# Patient Record
Sex: FEMALE | URBAN - METROPOLITAN AREA
[De-identification: names, ages, dates, MRNs, and addresses within clinical notes are randomized per-mention and may not be internally consistent; named-entity substitution may affect disease eponyms.]

---

## 2019-03-17 LAB — EXTERNAL GROUP B STREP ANTIGEN: NEGATIVE

## 2019-09-04 LAB
EXTERNAL ABO GROUPING: NORMAL
EXTERNAL HEPATITIS B SURFACE ANTIGEN: NEGATIVE
EXTERNAL HEPATITIS C, RNA QUANT PCR: NEGATIVE
EXTERNAL RH FACTOR: POSITIVE
EXTERNAL RUBELLA QUALITATIVE: NORMAL
EXTERNAL SYPHILIS RPR SCREEN: NORMAL
HIV1 P24 AG SERPL QL IA: NORMAL

## 2019-12-26 ENCOUNTER — TELEPHONE (OUTPATIENT)
Dept: CARDIOLOGY CLINIC | Age: 29
End: 2019-12-26

## 2020-04-15 ENCOUNTER — PREP FOR SURGERY (OUTPATIENT)
Dept: OTHER | Facility: HOSPITAL | Age: 30
End: 2020-04-15

## 2020-04-15 RX ORDER — MORPHINE SULFATE 4 MG/ML
4 INJECTION, SOLUTION INTRAMUSCULAR; INTRAVENOUS
Status: CANCELLED | OUTPATIENT
Start: 2020-04-15

## 2020-04-15 RX ORDER — LIDOCAINE HYDROCHLORIDE 10 MG/ML
5 INJECTION, SOLUTION EPIDURAL; INFILTRATION; INTRACAUDAL; PERINEURAL AS NEEDED
Status: CANCELLED | OUTPATIENT
Start: 2020-04-15

## 2020-04-15 RX ORDER — SODIUM CHLORIDE, SODIUM LACTATE, POTASSIUM CHLORIDE, CALCIUM CHLORIDE 600; 310; 30; 20 MG/100ML; MG/100ML; MG/100ML; MG/100ML
125 INJECTION, SOLUTION INTRAVENOUS CONTINUOUS
Status: CANCELLED | OUTPATIENT
Start: 2020-04-15

## 2020-04-15 RX ORDER — OXYTOCIN-SODIUM CHLORIDE 0.9% IV SOLN 30 UNIT/500ML 30-0.9/5 UT/ML-%
125 SOLUTION INTRAVENOUS CONTINUOUS PRN
Status: CANCELLED | OUTPATIENT
Start: 2020-04-15

## 2020-04-15 RX ORDER — MISOPROSTOL 200 UG/1
800 TABLET ORAL AS NEEDED
Status: CANCELLED | OUTPATIENT
Start: 2020-04-15

## 2020-04-15 RX ORDER — ONDANSETRON 4 MG/1
4 TABLET, FILM COATED ORAL EVERY 6 HOURS PRN
Status: CANCELLED | OUTPATIENT
Start: 2020-04-15

## 2020-04-15 RX ORDER — OXYTOCIN-SODIUM CHLORIDE 0.9% IV SOLN 30 UNIT/500ML 30-0.9/5 UT/ML-%
250 SOLUTION INTRAVENOUS CONTINUOUS
Status: CANCELLED | OUTPATIENT
Start: 2020-04-15 | End: 2020-04-15

## 2020-04-15 RX ORDER — ACETAMINOPHEN 325 MG/1
650 TABLET ORAL EVERY 4 HOURS PRN
Status: CANCELLED | OUTPATIENT
Start: 2020-04-15

## 2020-04-15 RX ORDER — SODIUM CHLORIDE 0.9 % (FLUSH) 0.9 %
3-10 SYRINGE (ML) INJECTION AS NEEDED
Status: CANCELLED | OUTPATIENT
Start: 2020-04-15

## 2020-04-15 RX ORDER — OXYTOCIN-SODIUM CHLORIDE 0.9% IV SOLN 30 UNIT/500ML 30-0.9/5 UT/ML-%
2 SOLUTION INTRAVENOUS
Status: CANCELLED | OUTPATIENT
Start: 2020-04-17

## 2020-04-15 RX ORDER — METHYLERGONOVINE MALEATE 0.2 MG/ML
200 INJECTION INTRAVENOUS ONCE AS NEEDED
Status: CANCELLED | OUTPATIENT
Start: 2020-04-15

## 2020-04-15 RX ORDER — CARBOPROST TROMETHAMINE 250 UG/ML
250 INJECTION, SOLUTION INTRAMUSCULAR AS NEEDED
Status: CANCELLED | OUTPATIENT
Start: 2020-04-15

## 2020-04-15 RX ORDER — ONDANSETRON 2 MG/ML
4 INJECTION INTRAMUSCULAR; INTRAVENOUS EVERY 6 HOURS PRN
Status: CANCELLED | OUTPATIENT
Start: 2020-04-15

## 2020-04-15 RX ORDER — IBUPROFEN 600 MG/1
600 TABLET ORAL EVERY 6 HOURS PRN
Status: CANCELLED | OUTPATIENT
Start: 2020-04-15

## 2020-04-15 RX ORDER — SODIUM CHLORIDE 0.9 % (FLUSH) 0.9 %
3 SYRINGE (ML) INJECTION EVERY 12 HOURS SCHEDULED
Status: CANCELLED | OUTPATIENT
Start: 2020-04-15

## 2020-04-15 RX ORDER — OXYTOCIN-SODIUM CHLORIDE 0.9% IV SOLN 30 UNIT/500ML 30-0.9/5 UT/ML-%
999 SOLUTION INTRAVENOUS ONCE
Status: CANCELLED | OUTPATIENT
Start: 2020-04-15 | End: 2020-04-15

## 2020-04-15 NOTE — H&P
Obstetric History and Physical     Chief Complaint: Induction of labor    Subjective     Patient is a 29 y.o. female  with EDC of 20 , who presents for induction of labor.    Her prenatal care is benign.  Her previous obstetric/gynecological history is noted for is non-contributory.      Prenatal Information: Please see office H&P for full history  Prenatal Results    The patient does not have an associated pregnancy episode and working LISA. Some results will not display without a pregnancy episode and working LISA.   POC Urine Glucose/Protein     Test Value Reference Range Date Time    Urine Glucose        Urine Protein              Initial Prenatal Labs     Test Value Reference Range Date Time    Hemoglobin        Hematocrit        Platelets        Rubella IgG        Hepatitis B SAg        Hepatitis C Ab        RPR        ABO        Rh        Antibody Screen        HIV        Urine Culture        Gonorrhea        Chlamydia        TSH              2nd and 3rd Trimester     Test Value Reference Range Date Time    Hemoglobin (repeated)        Hematocrit (repeated)        GCT        Antibody Screen (repeated)        GTT Fasting        GTT 1 Hr        GTT 2 Hr        GTT 3 Hr        Group B Strep              Drug Screening     Test Value Reference Range Date Time    Amphetamine Screen        Barbiturate Screen        Benzodiazepine Screen        Methadone Screen        Phencyclidine Screen        Opiates Screen        THC Screen        Cocaine Screen        Propoxyphene Screen        Buprenorphine Screen        Methamphetamine Screen        Oxycodone Screen        Tricyclic Antidepressants Screen              Other (Risk screening)     Test Value Reference Range Date Time    Varicella IgG        Parvovirus IgG        CMV IgG        Cystic Fibrosis        Hemoglobin electrophoresis        NIPT        MSAFP-4        AFP (for NTD only)                       Past OB History:       OB History   No data  available               Past Medical History: No past medical history on file.     Past Surgical History No past surgical history on file.     Family History: No family history on file.   Social History:  has no tobacco history on file.   has no alcohol history on file.   has no drug history on file.        General ROS: Pertinent items are noted in HPI    Objective      Vitals:   There were no vitals filed for this visit.    Fetal Heart Rate Assessment:   +FHTs per doppler in office       Physical Exam:     General Appearance:    Alert, cooperative, in no acute distress   Abdomen:     Soft, non-tender, no guarding, no rebound tenderness,       EFW 8#6oz per US on 4/15.   Pelvic Exam:    Pelvis adequate, proven to 9#    Presentation: Vertex    Cervix: 3/75/-2 in office   Extremities:   Moves all extremities well, no edema, no cyanosis, no              redness   Skin:   No bleeding, bruising or rash   Neurologic:   No focal neurologic defect          Laboratory Results:   Lab Results (last 48 hours)     ** No results found for the last 48 hours. **             Assessment/Plan     Active Problems:    * No active hospital problems. *         Assessment:  1.  Intrauterine pregnancy at 40+5 wks gestation on scheduled day of arrival with reassuring fetal status.    2.  IOL   3.  GBS status: Neg    Plan:  1. Pitocin IOL.   2. Plan of care has been reviewed with patient.  3.  Risks, benefits of treatment plan have been discussed.  4.  All questions have been answered.         Isabella Covington MD   4/15/2020   16:52

## 2020-04-17 ENCOUNTER — HOSPITAL ENCOUNTER (INPATIENT)
Facility: HOSPITAL | Age: 30
LOS: 2 days | Discharge: HOME | End: 2020-04-19
Attending: OBSTETRICS & GYNECOLOGY | Admitting: OBSTETRICS & GYNECOLOGY

## 2020-04-17 ENCOUNTER — ANESTHESIA EVENT (OUTPATIENT)
Dept: LABOR AND DELIVERY | Facility: HOSPITAL | Age: 30
End: 2020-04-17

## 2020-04-17 ENCOUNTER — ANESTHESIA (OUTPATIENT)
Dept: LABOR AND DELIVERY | Facility: HOSPITAL | Age: 30
End: 2020-04-17

## 2020-04-17 ENCOUNTER — HOSPITAL ENCOUNTER (OUTPATIENT)
Dept: LABOR AND DELIVERY | Facility: HOSPITAL | Age: 30
Discharge: HOME | End: 2020-04-17

## 2020-04-17 PROBLEM — Z34.90 PREGNANCY: Status: ACTIVE | Noted: 2020-04-17

## 2020-04-17 PROBLEM — Z34.90 PREGNANCY: Status: RESOLVED | Noted: 2020-04-17 | Resolved: 2020-04-17

## 2020-04-17 LAB
ABO GROUP BLD: NORMAL
BASOPHILS # BLD AUTO: 0 10*3/MM3 (ref 0–0.2)
BASOPHILS NFR BLD AUTO: 0.2 % (ref 0–1.5)
BLD GP AB SCN SERPL QL: NEGATIVE
DEPRECATED RDW RBC AUTO: 44.2 FL (ref 37–54)
EOSINOPHIL # BLD AUTO: 0 10*3/MM3 (ref 0–0.4)
EOSINOPHIL NFR BLD AUTO: 0.5 % (ref 0.3–6.2)
ERYTHROCYTE [DISTWIDTH] IN BLOOD BY AUTOMATED COUNT: 13 % (ref 12.3–15.4)
HCT VFR BLD AUTO: 38.5 % (ref 34–46.6)
HGB BLD-MCNC: 13.6 G/DL (ref 12–15.9)
HIV1+2 AB SER QL: NORMAL
LYMPHOCYTES # BLD AUTO: 1.7 10*3/MM3 (ref 0.7–3.1)
LYMPHOCYTES NFR BLD AUTO: 17.3 % (ref 19.6–45.3)
MCH RBC QN AUTO: 34.5 PG (ref 26.6–33)
MCHC RBC AUTO-ENTMCNC: 35.3 G/DL (ref 31.5–35.7)
MCV RBC AUTO: 97.6 FL (ref 79–97)
MONOCYTES # BLD AUTO: 0.6 10*3/MM3 (ref 0.1–0.9)
MONOCYTES NFR BLD AUTO: 6.6 % (ref 5–12)
NEUTROPHILS # BLD AUTO: 7.2 10*3/MM3 (ref 1.7–7)
NEUTROPHILS NFR BLD AUTO: 75.4 % (ref 42.7–76)
NRBC BLD AUTO-RTO: 0 /100 WBC (ref 0–0.2)
PLATELET # BLD AUTO: 146 10*3/MM3 (ref 140–450)
PMV BLD AUTO: 10.3 FL (ref 6–12)
RBC # BLD AUTO: 3.94 10*6/MM3 (ref 3.77–5.28)
RH BLD: POSITIVE
RPR SER QL: NORMAL
T&S EXPIRATION DATE: NORMAL
WBC NRBC COR # BLD: 9.5 10*3/MM3 (ref 3.4–10.8)

## 2020-04-17 PROCEDURE — 63710000001 ONDANSETRON PER 8 MG: Performed by: OBSTETRICS & GYNECOLOGY

## 2020-04-17 PROCEDURE — G0432 EIA HIV-1/HIV-2 SCREEN: HCPCS | Performed by: OBSTETRICS & GYNECOLOGY

## 2020-04-17 PROCEDURE — 86901 BLOOD TYPING SEROLOGIC RH(D): CPT | Performed by: OBSTETRICS & GYNECOLOGY

## 2020-04-17 PROCEDURE — 25010000002 ROPIVACAINE PER 1 MG: Performed by: ANESTHESIOLOGY

## 2020-04-17 PROCEDURE — 0UQMXZZ REPAIR VULVA, EXTERNAL APPROACH: ICD-10-PCS | Performed by: OBSTETRICS & GYNECOLOGY

## 2020-04-17 PROCEDURE — 86850 RBC ANTIBODY SCREEN: CPT | Performed by: OBSTETRICS & GYNECOLOGY

## 2020-04-17 PROCEDURE — 10907ZC DRAINAGE OF AMNIOTIC FLUID, THERAPEUTIC FROM PRODUCTS OF CONCEPTION, VIA NATURAL OR ARTIFICIAL OPENING: ICD-10-PCS | Performed by: OBSTETRICS & GYNECOLOGY

## 2020-04-17 PROCEDURE — 25010000002 FENTANYL CITRATE (PF) 100 MCG/2ML SOLUTION: Performed by: ANESTHESIOLOGY

## 2020-04-17 PROCEDURE — 85025 COMPLETE CBC W/AUTO DIFF WBC: CPT | Performed by: OBSTETRICS & GYNECOLOGY

## 2020-04-17 PROCEDURE — 86901 BLOOD TYPING SEROLOGIC RH(D): CPT

## 2020-04-17 PROCEDURE — 86900 BLOOD TYPING SEROLOGIC ABO: CPT | Performed by: OBSTETRICS & GYNECOLOGY

## 2020-04-17 PROCEDURE — 86592 SYPHILIS TEST NON-TREP QUAL: CPT | Performed by: OBSTETRICS & GYNECOLOGY

## 2020-04-17 PROCEDURE — 86900 BLOOD TYPING SEROLOGIC ABO: CPT

## 2020-04-17 RX ORDER — OXYTOCIN-SODIUM CHLORIDE 0.9% IV SOLN 30 UNIT/500ML 30-0.9/5 UT/ML-%
250 SOLUTION INTRAVENOUS CONTINUOUS
Status: ACTIVE | OUTPATIENT
Start: 2020-04-17 | End: 2020-04-17

## 2020-04-17 RX ORDER — ACETAMINOPHEN 325 MG/1
650 TABLET ORAL EVERY 4 HOURS PRN
Status: DISCONTINUED | OUTPATIENT
Start: 2020-04-17 | End: 2020-04-17

## 2020-04-17 RX ORDER — SODIUM CHLORIDE 0.9 % (FLUSH) 0.9 %
3-10 SYRINGE (ML) INJECTION AS NEEDED
Status: DISCONTINUED | OUTPATIENT
Start: 2020-04-17 | End: 2020-04-17

## 2020-04-17 RX ORDER — PRENATAL VIT/IRON FUM/FOLIC AC 27MG-0.8MG
1 TABLET ORAL DAILY
Status: DISCONTINUED | OUTPATIENT
Start: 2020-04-18 | End: 2020-04-19 | Stop reason: HOSPADM

## 2020-04-17 RX ORDER — OXYTOCIN-SODIUM CHLORIDE 0.9% IV SOLN 30 UNIT/500ML 30-0.9/5 UT/ML-%
2 SOLUTION INTRAVENOUS
Status: DISCONTINUED | OUTPATIENT
Start: 2020-04-17 | End: 2020-04-17

## 2020-04-17 RX ORDER — ROPIVACAINE HYDROCHLORIDE 2 MG/ML
INJECTION, SOLUTION EPIDURAL; INFILTRATION; PERINEURAL CONTINUOUS PRN
Status: DISCONTINUED | OUTPATIENT
Start: 2020-04-17 | End: 2020-04-17 | Stop reason: SURG

## 2020-04-17 RX ORDER — LIDOCAINE HYDROCHLORIDE 10 MG/ML
5 INJECTION, SOLUTION EPIDURAL; INFILTRATION; INTRACAUDAL; PERINEURAL AS NEEDED
Status: DISCONTINUED | OUTPATIENT
Start: 2020-04-17 | End: 2020-04-17

## 2020-04-17 RX ORDER — DOCUSATE SODIUM 100 MG/1
100 CAPSULE, LIQUID FILLED ORAL 2 TIMES DAILY
Status: DISCONTINUED | OUTPATIENT
Start: 2020-04-17 | End: 2020-04-19 | Stop reason: HOSPADM

## 2020-04-17 RX ORDER — ONDANSETRON 2 MG/ML
4 INJECTION INTRAMUSCULAR; INTRAVENOUS EVERY 6 HOURS PRN
Status: DISCONTINUED | OUTPATIENT
Start: 2020-04-17 | End: 2020-04-17

## 2020-04-17 RX ORDER — LANOLIN 100 %
OINTMENT (GRAM) TOPICAL
Status: DISCONTINUED | OUTPATIENT
Start: 2020-04-17 | End: 2020-04-19 | Stop reason: HOSPADM

## 2020-04-17 RX ORDER — CARBOPROST TROMETHAMINE 250 UG/ML
250 INJECTION, SOLUTION INTRAMUSCULAR AS NEEDED
Status: DISCONTINUED | OUTPATIENT
Start: 2020-04-17 | End: 2020-04-17 | Stop reason: HOSPADM

## 2020-04-17 RX ORDER — MAGNESIUM CARB/ALUMINUM HYDROX 105-160MG
30 TABLET,CHEWABLE ORAL DAILY PRN
Status: DISCONTINUED | OUTPATIENT
Start: 2020-04-17 | End: 2020-04-17

## 2020-04-17 RX ORDER — MISOPROSTOL 200 UG/1
800 TABLET ORAL AS NEEDED
Status: DISCONTINUED | OUTPATIENT
Start: 2020-04-17 | End: 2020-04-17 | Stop reason: HOSPADM

## 2020-04-17 RX ORDER — SODIUM CHLORIDE, SODIUM LACTATE, POTASSIUM CHLORIDE, CALCIUM CHLORIDE 600; 310; 30; 20 MG/100ML; MG/100ML; MG/100ML; MG/100ML
125 INJECTION, SOLUTION INTRAVENOUS CONTINUOUS
Status: DISCONTINUED | OUTPATIENT
Start: 2020-04-17 | End: 2020-04-17

## 2020-04-17 RX ORDER — OXYTOCIN-SODIUM CHLORIDE 0.9% IV SOLN 30 UNIT/500ML 30-0.9/5 UT/ML-%
999 SOLUTION INTRAVENOUS ONCE
Status: DISCONTINUED | OUTPATIENT
Start: 2020-04-17 | End: 2020-04-19 | Stop reason: HOSPADM

## 2020-04-17 RX ORDER — OXYTOCIN-SODIUM CHLORIDE 0.9% IV SOLN 30 UNIT/500ML 30-0.9/5 UT/ML-%
125 SOLUTION INTRAVENOUS CONTINUOUS PRN
Status: DISCONTINUED | OUTPATIENT
Start: 2020-04-17 | End: 2020-04-19 | Stop reason: HOSPADM

## 2020-04-17 RX ORDER — IBUPROFEN 600 MG/1
600 TABLET ORAL EVERY 6 HOURS PRN
Status: DISCONTINUED | OUTPATIENT
Start: 2020-04-17 | End: 2020-04-17 | Stop reason: SDUPTHER

## 2020-04-17 RX ORDER — LIDOCAINE HYDROCHLORIDE AND EPINEPHRINE 10; 10 MG/ML; UG/ML
INJECTION, SOLUTION INFILTRATION; PERINEURAL AS NEEDED
Status: DISCONTINUED | OUTPATIENT
Start: 2020-04-17 | End: 2020-04-17 | Stop reason: SURG

## 2020-04-17 RX ORDER — SODIUM CHLORIDE 0.9 % (FLUSH) 0.9 %
3 SYRINGE (ML) INJECTION EVERY 12 HOURS SCHEDULED
Status: DISCONTINUED | OUTPATIENT
Start: 2020-04-17 | End: 2020-04-17

## 2020-04-17 RX ORDER — IBUPROFEN 600 MG/1
600 TABLET ORAL EVERY 6 HOURS PRN
Status: DISCONTINUED | OUTPATIENT
Start: 2020-04-17 | End: 2020-04-19 | Stop reason: HOSPADM

## 2020-04-17 RX ORDER — ROPIVACAINE HYDROCHLORIDE 2 MG/ML
INJECTION, SOLUTION EPIDURAL; INFILTRATION; PERINEURAL
Status: COMPLETED
Start: 2020-04-17 | End: 2020-04-17

## 2020-04-17 RX ORDER — METHYLERGONOVINE MALEATE 0.2 MG/ML
200 INJECTION INTRAVENOUS ONCE AS NEEDED
Status: DISCONTINUED | OUTPATIENT
Start: 2020-04-17 | End: 2020-04-17 | Stop reason: HOSPADM

## 2020-04-17 RX ORDER — PRENATAL VIT/IRON FUM/FOLIC AC 27MG-0.8MG
1 TABLET ORAL DAILY
COMMUNITY
End: 2023-03-07

## 2020-04-17 RX ORDER — ONDANSETRON 4 MG/1
4 TABLET, FILM COATED ORAL EVERY 6 HOURS PRN
Status: DISCONTINUED | OUTPATIENT
Start: 2020-04-17 | End: 2020-04-17

## 2020-04-17 RX ORDER — FENTANYL CITRATE 50 UG/ML
INJECTION, SOLUTION INTRAMUSCULAR; INTRAVENOUS
Status: COMPLETED | OUTPATIENT
Start: 2020-04-17 | End: 2020-04-17

## 2020-04-17 RX ORDER — FENTANYL CITRATE 50 UG/ML
INJECTION, SOLUTION INTRAMUSCULAR; INTRAVENOUS
Status: DISPENSED
Start: 2020-04-17 | End: 2020-04-18

## 2020-04-17 RX ORDER — SODIUM CHLORIDE 0.9 % (FLUSH) 0.9 %
1-10 SYRINGE (ML) INJECTION AS NEEDED
Status: DISCONTINUED | OUTPATIENT
Start: 2020-04-17 | End: 2020-04-19 | Stop reason: HOSPADM

## 2020-04-17 RX ORDER — ONDANSETRON 4 MG/1
4 TABLET, FILM COATED ORAL EVERY 8 HOURS PRN
Status: DISCONTINUED | OUTPATIENT
Start: 2020-04-17 | End: 2020-04-19 | Stop reason: HOSPADM

## 2020-04-17 RX ORDER — ACETAMINOPHEN 325 MG/1
650 TABLET ORAL EVERY 4 HOURS PRN
Status: DISCONTINUED | OUTPATIENT
Start: 2020-04-17 | End: 2020-04-17 | Stop reason: HOSPADM

## 2020-04-17 RX ORDER — MORPHINE SULFATE 4 MG/ML
4 INJECTION, SOLUTION INTRAMUSCULAR; INTRAVENOUS
Status: DISCONTINUED | OUTPATIENT
Start: 2020-04-17 | End: 2020-04-17

## 2020-04-17 RX ADMIN — ROPIVACAINE HYDROCHLORIDE 10 ML/HR: 2 INJECTION, SOLUTION EPIDURAL; INFILTRATION at 12:27

## 2020-04-17 RX ADMIN — DOCUSATE SODIUM 100 MG: 100 CAPSULE, LIQUID FILLED ORAL at 21:08

## 2020-04-17 RX ADMIN — BENZOCAINE: 11.4 AEROSOL, SPRAY TOPICAL at 15:40

## 2020-04-17 RX ADMIN — OXYTOCIN 2 MILLI-UNITS/MIN: 10 INJECTION INTRAVENOUS at 06:52

## 2020-04-17 RX ADMIN — SODIUM CHLORIDE, SODIUM LACTATE, POTASSIUM CHLORIDE, AND CALCIUM CHLORIDE 125 ML/HR: 600; 310; 30; 20 INJECTION, SOLUTION INTRAVENOUS at 05:56

## 2020-04-17 RX ADMIN — OXYTOCIN 250 ML/HR: 10 INJECTION INTRAVENOUS at 15:41

## 2020-04-17 RX ADMIN — FENTANYL CITRATE 100 MCG: 50 INJECTION, SOLUTION INTRAMUSCULAR; INTRAVENOUS at 12:29

## 2020-04-17 RX ADMIN — WITCH HAZEL 1 PAD: 500 SOLUTION RECTAL; TOPICAL at 15:40

## 2020-04-17 RX ADMIN — LIDOCAINE HYDROCHLORIDE,EPINEPHRINE BITARTRATE 3 ML: 10; .01 INJECTION, SOLUTION INFILTRATION; PERINEURAL at 12:25

## 2020-04-17 RX ADMIN — ONDANSETRON HYDROCHLORIDE 4 MG: 4 TABLET, FILM COATED ORAL at 16:16

## 2020-04-17 NOTE — ANESTHESIA PREPROCEDURE EVALUATION
Anesthesia Evaluation     Patient summary reviewed and Nursing notes reviewed   NPO Solid Status: > 6 hours  NPO Liquid Status: > 6 hours           Airway   Mallampati: II  TM distance: >3 FB  Neck ROM: full  No difficulty expected  Dental - normal exam     Pulmonary - negative pulmonary ROS and normal exam    breath sounds clear to auscultation  Cardiovascular - negative cardio ROS and normal exam    ECG reviewed  Rhythm: regular  Rate: normal        Neuro/Psych- negative ROS  GI/Hepatic/Renal/Endo - negative ROS     Musculoskeletal (-) negative ROS    Abdominal  - normal exam    Abdomen: soft.  Bowel sounds: normal.   Substance History - negative use     OB/GYN    (+) Pregnant,         Other - negative ROS                       Anesthesia Plan    ASA 2     CSE       Anesthetic plan, all risks, benefits, and alternatives have been provided, discussed and informed consent has been obtained with: patient.

## 2020-04-17 NOTE — ANESTHESIA POSTPROCEDURE EVALUATION
Patient: Victoriano Garcia    Procedure Summary     Date:  04/17/20 Room / Location:      Anesthesia Start:  1214 Anesthesia Stop:  1352    Procedure:  LABOR ANALGESIA Diagnosis:      Scheduled Providers:   Provider:  Tej Dunham MD    Anesthesia Type:  CSE ASA Status:  2          Anesthesia Type: CSE    Vitals  Vitals Value Taken Time   /71 4/17/2020  2:00 PM   Temp     Pulse 61 4/17/2020  2:00 PM   Resp     SpO2 100 % 4/17/2020  1:49 PM   Vitals shown include unvalidated device data.        Post Anesthesia Care and Evaluation    Patient location during evaluation: bedside  Patient participation: complete - patient participated  Level of consciousness: awake  Pain scale: See nurse's notes for pain score.  Pain management: adequate  Airway patency: patent  Anesthetic complications: No anesthetic complications  PONV Status: none  Cardiovascular status: acceptable  Respiratory status: acceptable  Hydration status: acceptable    Comments: Patient seen and examined postoperatively; vital signs stable; SpO2 greater than or equal to 90%; cardiopulmonary status stable; nausea/vomiting adequately controlled; pain adequately controlled; no apparent anesthesia complications; patient discharged from anesthesia care when discharge criteria were met

## 2020-04-17 NOTE — INTERVAL H&P NOTE
H&P updated. The patient was examined and no complaints   [place] q 3 FHTs category 1  SVE 4/50/-2   AROM-clear  Term IOL   GBS neg  FSR   Cont pitocin.

## 2020-04-17 NOTE — ANESTHESIA PROCEDURE NOTES
CSE Block      Patient reassessed immediately prior to procedure    Patient location during procedure: OB  Start time: 4/17/2020 12:14 PM  End time: 4/17/2020 12:29 PM  Reason for block: at surgeon's request  Staffing  Anesthesiologist: Jose Covington MD  Preanesthetic Checklist  Completed: patient identified, site marked, surgical consent, pre-op evaluation, timeout performed, IV checked, risks and benefits discussed and monitors and equipment checked  CSE  Patient position: sitting  Patient monitoring: blood pressure monitoring and continuous pulse oximetry  Procedures: landmark technique and palpation technique  Spinal Needle  Needle type: Gordon (Through Tuohy)   Needle gauge: 27 G  Approach: midline  Location: L3-4  Fluid Appearance: clear  Spinal medications used: fentaNYL (SUBLIMAZE) injection, 100 mcg  Epidural Needle  Injection technique: RENETTA air  Needle type: Tuohy   Needle gauge: 18 G  Location: L3-L4  Level: 3-4  Aspiration: negative  Test dose: negative      Catheter  Catheter type: end hole  Catheter size: 20 G  Assessment  Dressing:occlusive dressing applied and secured with tape  Pt Tolerance:patient tolerated the procedure well with no apparent complications  Complications:no

## 2020-04-17 NOTE — PROGRESS NOTES
" [facility]  Obstetric Progress Note    Subjective   Getting epidural.     Objective     Vitals:  Vitals:    04/17/20 0536 04/17/20 0600 04/17/20 0700   BP:  111/70 103/66   BP Location:  Right arm    Patient Position:  Sitting    Pulse:  87 81   Resp:  18 18   Temp:  98.5 °F (36.9 °C)    TempSrc:  Oral    SpO2:  100%    Weight: 89 kg (196 lb 3.4 oz) 89 kg (196 lb 3.4 oz)    Height: 165.1 cm (65\") 165.1 cm (65\")      Flowsheet Rows      First Filed Value   Admission Height  165.1 cm (65\") Documented at 04/17/2020 0536   Admission Weight  89 kg (196 lb 3.4 oz) Documented at 04/17/2020 0536        No intake or output data in the 24 hours ending 04/17/20 1216    Fetal Heart Rate Assessment:   Category 1  [place]:  q 2-3    Physical Exam:  General: Patient is in no acute distress    Pelvic Exam: 7-8/C/-1            Assessment/Plan     Active Problems:    Pregnancy         Assessment:  1.  Intrauterine pregnancy at 40w5d gestation with reactive fetal status.    2.  IOL  3.  GBS status: Neg  Plan:  1. Cont pitocin IOL, pt getting epidural now.   2. Plan of care has been reviewed with patient.  3.  Risks, benefits of treatment plan have been discussed.  4.  All questions have been answered.        Isabella Covington MD  4/17/2020  12:16    "

## 2020-04-17 NOTE — L&D DELIVERY NOTE
[facility]  Vaginal Delivery Note    Diagnosis     Patient is a 29 y.o. female  currently at 40w5d, who presents with induction of labor.      Delivery     Delivery:  Spontaneous Vaginal Delivery    Date of Delivery:  2020   Anesthesia:  Epidural   Delivering clinician: Isabella Covington MD      Delivery narrative:  Pt presented for IOL at term.  She was 3cm dilated on arrival and underwent pitocin induction and amniotomy.  Fetal status was reassuring throughout labor.  She was complete by about 1300 and pushed with excellent effort for 30 minutes to deliver a vigorous infant without difficulty in the direct OP position.     Infant    Findings: VFI     Apgars: 9&9 at 1 and 5 minutes.      Placenta, Cord, and Fluid    Placenta delivered  spontaneous  3VC    Bimanual massage and Pitocin 30 U in 500cc bolus given after placental delivery.  There was good hemostasis and a firm uterine tone.        Lacerations       Small periurethral laceration was reapaired with 3.0 Vicryl rapide in the usual fashion.     Estimated Blood Loss 200cc     Complications  none    Disposition  Mother to Mother Baby/Postpartum  in stable condition currently.  Baby to remains with mom  in stable condition currently.      Isabella Covington MD  20  14:03

## 2020-04-18 LAB
BASOPHILS # BLD AUTO: 0 10*3/MM3 (ref 0–0.2)
BASOPHILS NFR BLD AUTO: 0.1 % (ref 0–1.5)
DEPRECATED RDW RBC AUTO: 44.6 FL (ref 37–54)
EOSINOPHIL # BLD AUTO: 0 10*3/MM3 (ref 0–0.4)
EOSINOPHIL NFR BLD AUTO: 0.3 % (ref 0.3–6.2)
ERYTHROCYTE [DISTWIDTH] IN BLOOD BY AUTOMATED COUNT: 13 % (ref 12.3–15.4)
HCT VFR BLD AUTO: 37.5 % (ref 34–46.6)
HGB BLD-MCNC: 12.9 G/DL (ref 12–15.9)
LYMPHOCYTES # BLD AUTO: 1.8 10*3/MM3 (ref 0.7–3.1)
LYMPHOCYTES NFR BLD AUTO: 15.4 % (ref 19.6–45.3)
MCH RBC QN AUTO: 33.8 PG (ref 26.6–33)
MCHC RBC AUTO-ENTMCNC: 34.4 G/DL (ref 31.5–35.7)
MCV RBC AUTO: 98.2 FL (ref 79–97)
MONOCYTES # BLD AUTO: 0.9 10*3/MM3 (ref 0.1–0.9)
MONOCYTES NFR BLD AUTO: 7.6 % (ref 5–12)
NEUTROPHILS # BLD AUTO: 9 10*3/MM3 (ref 1.7–7)
NEUTROPHILS NFR BLD AUTO: 76.6 % (ref 42.7–76)
NRBC BLD AUTO-RTO: 0 /100 WBC (ref 0–0.2)
PLATELET # BLD AUTO: 144 10*3/MM3 (ref 140–450)
PMV BLD AUTO: 9.8 FL (ref 6–12)
RBC # BLD AUTO: 3.81 10*6/MM3 (ref 3.77–5.28)
WBC NRBC COR # BLD: 11.7 10*3/MM3 (ref 3.4–10.8)

## 2020-04-18 PROCEDURE — 85025 COMPLETE CBC W/AUTO DIFF WBC: CPT | Performed by: OBSTETRICS & GYNECOLOGY

## 2020-04-18 RX ADMIN — IBUPROFEN 600 MG: 600 TABLET, FILM COATED ORAL at 07:58

## 2020-04-18 RX ADMIN — DOCUSATE SODIUM 100 MG: 100 CAPSULE, LIQUID FILLED ORAL at 07:58

## 2020-04-18 RX ADMIN — IBUPROFEN 600 MG: 600 TABLET, FILM COATED ORAL at 19:08

## 2020-04-18 RX ADMIN — DOCUSATE SODIUM 100 MG: 100 CAPSULE, LIQUID FILLED ORAL at 20:31

## 2020-04-18 RX ADMIN — WITCH HAZEL 1 PAD: 500 SOLUTION RECTAL; TOPICAL at 14:29

## 2020-04-18 RX ADMIN — PRENATAL VIT W/ FE FUMARATE-FA TAB 27-0.8 MG 1 TABLET: 27-0.8 TAB at 07:58

## 2020-04-18 NOTE — PROGRESS NOTES
[facility]  Postpartum Note    Subjective   Postpartum Day 1:  Spontaneous Vaginal Delivery    Patient without complaints. Her pain is well controlled with nonsteroidal anti-inflammatory drugs. She is ambulating well.  Patient describes her bleeding as thin lochia.    Breastfeeding: infant latching without difficulty.    Objective     Vitals:  Vitals:    04/17/20 0700 04/17/20 1730 04/18/20 0040 04/18/20 0801   BP: 103/66 102/62 104/65 107/67   BP Location:  Right arm Right arm    Patient Position:  Sitting Sitting    Pulse: 81 87 81 73   Resp: 18 18 16 16   Temp:  98.7 °F (37.1 °C) 98.9 °F (37.2 °C) 98.9 °F (37.2 °C)   TempSrc:  Oral Oral    SpO2:   97%    Weight:       Height:           Physical Exam:  General:  no acute distress.  Abdomen: abdomen is soft without significant tenderness, masses, organomegaly or guarding. Fundus: appropriate, firm, non tender  Incision: n/a  Extremities: normal,  trace edema.     Labs:  Lab Results (last 72 hours)     Procedure Component Value Units Date/Time    CBC & Differential [[id number]] Collected:  04/18/20 0601    Specimen:  Blood Updated:  04/18/20 0620    Narrative:       The following orders were created for panel order CBC & Differential.  Procedure                               Abnormality         Status                     ---------                               -----------         ------                     CBC Auto Differential[[id number]]        Abnormal            Final result                 Please view results for these tests on the individual orders.    CBC Auto Differential [[id number]]  (Abnormal) Collected:  04/18/20 0601    Specimen:  Blood Updated:  04/18/20 0620     WBC 11.70 10*3/mm3      RBC 3.81 10*6/mm3      Hemoglobin 12.9 g/dL      Hematocrit 37.5 %      MCV 98.2 fL      MCH 33.8 pg      MCHC 34.4 g/dL      RDW 13.0 %      RDW-SD 44.6 fl      MPV 9.8 fL      Platelets 144 10*3/mm3      Neutrophil % 76.6 %      Lymphocyte % 15.4 %      Monocyte % 7.6 %       Eosinophil % 0.3 %      Basophil % 0.1 %      Neutrophils, Absolute 9.00 10*3/mm3      Lymphocytes, Absolute 1.80 10*3/mm3      Monocytes, Absolute 0.90 10*3/mm3      Eosinophils, Absolute 0.00 10*3/mm3      Basophils, Absolute 0.00 10*3/mm3      nRBC 0.0 /100 WBC     RPR [[id number]]  (Normal) Collected:  04/17/20 0555    Specimen:  Blood Updated:  04/17/20 1439     RPR Non-Reactive    HIV-1 & HIV-2 Antibodies [[id number]] Collected:  04/17/20 0556    Specimen:  Blood Updated:  04/17/20 0652    Narrative:       The following orders were created for panel order HIV-1 & HIV-2 Antibodies.  Procedure                               Abnormality         Status                     ---------                               -----------         ------                     HIV-1 / O / 2 Ag / Antib...[[id number]]  Normal              Final result                 Please view results for these tests on the individual orders.    HIV-1 / O / 2 Ag / Antibody 4th Generation [[id number]]  (Normal) Collected:  04/17/20 0556    Specimen:  Blood Updated:  04/17/20 0652     HIV-1/ HIV-2 Non-Reactive     Comment: A non-reactive test result does not preclude the possibility of exposure to HIV or infection with HIV. An antibody response to recent exposure may take several months to reach detectable levels.       Narrative:       The HIV antibody/antigen combo assay is a qualitative assay for HIV that includes the p24 antigen as well as antibodies to HIV types 1 and 2. This test is intended to be used as a screening assay in the diagnosis of HIV infection in patients over the age of 2.  Results may be falsely decreased if patient taking Biotin.      CBC & Differential [[id number]] Collected:  04/17/20 0555    Specimen:  Blood Updated:  04/17/20 0609    Narrative:       The following orders were created for panel order CBC & Differential.  Procedure                               Abnormality         Status                     ---------                                -----------         ------                     CBC Auto Differential[[id number]]        Abnormal            Final result                 Please view results for these tests on the individual orders.    CBC Auto Differential [[id number]]  (Abnormal) Collected:  20    Specimen:  Blood Updated:  20     WBC 9.50 10*3/mm3      RBC 3.94 10*6/mm3      Hemoglobin 13.6 g/dL      Hematocrit 38.5 %      MCV 97.6 fL      MCH 34.5 pg      MCHC 35.3 g/dL      RDW 13.0 %      RDW-SD 44.2 fl      MPV 10.3 fL      Platelets 146 10*3/mm3      Neutrophil % 75.4 %      Lymphocyte % 17.3 %      Monocyte % 6.6 %      Eosinophil % 0.5 %      Basophil % 0.2 %      Neutrophils, Absolute 7.20 10*3/mm3      Lymphocytes, Absolute 1.70 10*3/mm3      Monocytes, Absolute 0.60 10*3/mm3      Eosinophils, Absolute 0.00 10*3/mm3      Basophils, Absolute 0.00 10*3/mm3      nRBC 0.0 /100 WBC     Hepatitis B Surface Antigen [[id number]] Resulted:  19     Specimen:  Blood Updated:  20     External Hepatitis B Surface Ag Negative    RPR [[id number]] Resulted:  19     Specimen:  Blood Updated:  20     External RPR Non-Reactive    Rubella Antibody, IgG [[id number]] Resulted:  19     Specimen:  Blood Updated:  20     External Rubella Qual Immune    HIV-1 Antibody, EIA [[id number]] Resulted:  19     Specimen:  Blood Updated:  20     External HIV Antibody Non-Reactive    Group B Streptococcus Culture - Swab, Vaginal/Rectum [[id number]] Resulted:  19     Specimen:  Swab from Vaginal/Rectum Updated:  20     External Strep Group B Ag Negative    Hepatitis C RNA, Quantitative, PCR (graph) [[id number]] Resulted:  19     Specimen:  Blood Updated:  2051     External Hepatitis C, RNA Quant PCR Negative           Feeding method: Breastfeeding Status: Yes     Blood Type: RH Positive        Assessment/Plan     Active Problems:     (normal  spontaneous vaginal delivery)      Victoriano FAYE Jose is Day 1  post-partum from a  Spontaneous Vaginal Delivery      Plan:  Continue current care.      Catherine Farah MD  4/18/2020  10:33

## 2020-04-18 NOTE — LACTATION NOTE
This note was copied from a baby's chart.  Provided mother with handouts, lansinoh and gel pads. Basic teaching done. Denies history of breast surgery. Denies use of routine medications. Denies allergy to wool. Did not breastfeed other 2 children. Does not have a breastpump, discussed contacting insurance company to inquire about breastpump coverage. Declines breastfeeding DVD. Observed feeding in cradle hold. Baby latched well, audible swallowing. Instructed on gentle stimulation to keep baby feeding. Praised efforts. Encouraged to call as needed.

## 2020-04-18 NOTE — PLAN OF CARE
Problem: Postpartum (Vaginal Delivery) (Adult,Obstetrics,Pediatric)  Goal: Signs and Symptoms of Listed Potential Problems Will be Absent, Minimized or Managed (Postpartum)  4/18/2020 0457 by Isabel Feliciano RN  Outcome: Ongoing (interventions implemented as appropriate)  Flowsheets (Taken 4/18/2020 0457)  Problems Assessed (Postpartum Vaginal Delivery): all  Problems Present (Postpartum Vag Deliv): none  Note:   Pt up at [facility]. Pt breastfeeding well.  4/18/2020 0456 by Isabel Feliciano, RN  Outcome: Ongoing (interventions implemented as appropriate)  Flowsheets (Taken 4/18/2020 0456)  Problems Assessed (Postpartum Vaginal Delivery): all  Problems Present (Postpartum Vag Deliv): none

## 2020-04-18 NOTE — LACTATION NOTE
This note was copied from a baby's chart.  Discussed potential for jaundice. Provided with info sheet on breastfeeding and jaundice.

## 2020-04-19 VITALS
DIASTOLIC BLOOD PRESSURE: 76 MMHG | HEIGHT: 65 IN | SYSTOLIC BLOOD PRESSURE: 111 MMHG | TEMPERATURE: 98.5 F | HEART RATE: 77 BPM | BODY MASS INDEX: 32.69 KG/M2 | WEIGHT: 196.21 LBS | OXYGEN SATURATION: 97 % | RESPIRATION RATE: 18 BRPM

## 2020-04-19 RX ORDER — IBUPROFEN 600 MG/1
600 TABLET ORAL EVERY 6 HOURS PRN
Qty: 30 TABLET | Refills: 0 | Status: SHIPPED | OUTPATIENT
Start: 2020-04-19

## 2020-04-19 RX ADMIN — IBUPROFEN 600 MG: 600 TABLET, FILM COATED ORAL at 08:06

## 2020-04-19 RX ADMIN — PRENATAL VIT W/ FE FUMARATE-FA TAB 27-0.8 MG 1 TABLET: 27-0.8 TAB at 08:07

## 2020-04-19 RX ADMIN — BENZOCAINE 1 SPRAY: 11.4 AEROSOL, SPRAY TOPICAL at 10:23

## 2020-04-19 RX ADMIN — DOCUSATE SODIUM 100 MG: 100 CAPSULE, LIQUID FILLED ORAL at 08:07

## 2020-04-19 NOTE — DISCHARGE SUMMARY
[facility]  Delivery Discharge Summary    Primary OB Clinician: Isabella Covington MD    Admission Diagnosis:  Active Problems:     (normal spontaneous vaginal delivery)      Discharge Diagnosis:  <principal problem not specified>    Gestational Age: 40w5d    Date of Delivery: 2020     Delivered By:        Delivery Type: Vaginal, Spontaneous      Tubal Ligation: n/a    Intrapartum Course: Uncomplicated delivery.     Postpartum Course:  Uncomplicated pp course.     Physical Exam:    Vitals:   Vitals:    20 0040 20 0801 20 1157 20 0812   BP: 104/65 107/67 121/62 111/76   BP Location: Right arm  Left arm    Patient Position: Sitting  Sitting    Pulse: 81 73 80 77   Resp: 16 16  18   Temp: 98.9 °F (37.2 °C) 98.9 °F (37.2 °C) 98.7 °F (37.1 °C) 98.5 °F (36.9 °C)   TempSrc: Oral  Oral    SpO2: 97%  97%    Weight:       Height:         Temp (24hrs), Av.6 °F (37 °C), Min:98.5 °F (36.9 °C), Max:98.7 °F (37.1 °C)      General Appearance:    Alert, cooperative, in no acute distress       Extremities:   Moves all extremities well, no edema, no cyanosis, no              Redness.             Feeding method: Breastfeeding Status: Yes    Blood Type: RH Positive      Plan:  Discharge to home.    Follow-up appointment with Dr. Covington in 6 weeks.

## 2020-04-19 NOTE — LACTATION NOTE
"This note was copied from a baby's chart.  Mother reports feedings continue to go well. Breasts are feeling some changes. She is able to hear audible \"gulping\". Left nipple slightly tender, reinforced nipple care, provided with more gel pads. Discussed first night at home. Provided with  discharge weight ticket and lactation contact card. Encouraged to call as needed.  "

## 2020-04-19 NOTE — PLAN OF CARE
Problem: Postpartum (Vaginal Delivery) (Adult,Obstetrics,Pediatric)  Goal: Signs and Symptoms of Listed Potential Problems Will be Absent, Minimized or Managed (Postpartum)  Outcome: Ongoing (interventions implemented as appropriate)  Flowsheets (Taken 4/19/2020 7230)  Problems Assessed (Postpartum Vaginal Delivery): all  Problems Present (Postpartum Vag Deliv): none  Note:   Cont. To monitor.

## 2020-04-20 NOTE — PROGRESS NOTES
Case Management Discharge Note      Final Note: Discharged home self care.                   Final Discharge Disposition Code: 01 - home or self-care

## 2020-04-28 ENCOUNTER — TELEPHONE (OUTPATIENT)
Dept: OBSTETRICS AND GYNECOLOGY | Facility: HOSPITAL | Age: 30
End: 2020-04-28

## 2020-05-07 ENCOUNTER — HOSPITAL ENCOUNTER (EMERGENCY)
Facility: HOSPITAL | Age: 30
Discharge: HOME | End: 2020-05-08
Admitting: EMERGENCY MEDICINE

## 2020-05-07 VITALS
SYSTOLIC BLOOD PRESSURE: 113 MMHG | HEART RATE: 71 BPM | TEMPERATURE: 98 F | HEIGHT: 65 IN | BODY MASS INDEX: 30.23 KG/M2 | OXYGEN SATURATION: 98 % | DIASTOLIC BLOOD PRESSURE: 81 MMHG | WEIGHT: 181.44 LBS | RESPIRATION RATE: 16 BRPM

## 2020-05-07 DIAGNOSIS — M79.662 PAIN IN LEFT LOWER LEG: Primary | ICD-10-CM

## 2020-05-07 PROCEDURE — 99282 EMERGENCY DEPT VISIT SF MDM: CPT

## 2020-05-08 ENCOUNTER — HOSPITAL ENCOUNTER (OUTPATIENT)
Dept: CARDIOLOGY | Facility: HOSPITAL | Age: 30
Discharge: HOME | End: 2020-05-08

## 2020-05-08 ENCOUNTER — TRANSCRIBE ORDERS (OUTPATIENT)
Dept: CARDIOLOGY | Facility: HOSPITAL | Age: 30
End: 2020-05-08

## 2020-05-08 DIAGNOSIS — M79.605 LOWER EXTREMITY PAIN, LEFT: Primary | ICD-10-CM

## 2020-05-08 DIAGNOSIS — M79.605 LOWER EXTREMITY PAIN, LEFT: ICD-10-CM

## 2020-05-08 LAB
BH CV LOWER VASCULAR LEFT COMMON FEMORAL AUGMENT: NORMAL
BH CV LOWER VASCULAR LEFT COMMON FEMORAL COMPETENT: NORMAL
BH CV LOWER VASCULAR LEFT COMMON FEMORAL COMPRESS: NORMAL
BH CV LOWER VASCULAR LEFT COMMON FEMORAL PHASIC: NORMAL
BH CV LOWER VASCULAR LEFT COMMON FEMORAL SPONT: NORMAL
BH CV LOWER VASCULAR LEFT DISTAL FEMORAL COMPRESS: NORMAL
BH CV LOWER VASCULAR LEFT GASTRONEMIUS COMPRESS: NORMAL
BH CV LOWER VASCULAR LEFT GREATER SAPH AK COMPRESS: NORMAL
BH CV LOWER VASCULAR LEFT GREATER SAPH BK COMPRESS: NORMAL
BH CV LOWER VASCULAR LEFT LESSER SAPH COMPRESS: NORMAL
BH CV LOWER VASCULAR LEFT MID FEMORAL AUGMENT: NORMAL
BH CV LOWER VASCULAR LEFT MID FEMORAL COMPETENT: NORMAL
BH CV LOWER VASCULAR LEFT MID FEMORAL COMPRESS: NORMAL
BH CV LOWER VASCULAR LEFT MID FEMORAL PHASIC: NORMAL
BH CV LOWER VASCULAR LEFT MID FEMORAL SPONT: NORMAL
BH CV LOWER VASCULAR LEFT PERONEAL COMPRESS: NORMAL
BH CV LOWER VASCULAR LEFT POPLITEAL AUGMENT: NORMAL
BH CV LOWER VASCULAR LEFT POPLITEAL COMPETENT: NORMAL
BH CV LOWER VASCULAR LEFT POPLITEAL COMPRESS: NORMAL
BH CV LOWER VASCULAR LEFT POPLITEAL PHASIC: NORMAL
BH CV LOWER VASCULAR LEFT POPLITEAL SPONT: NORMAL
BH CV LOWER VASCULAR LEFT POSTERIOR TIBIAL COMPRESS: NORMAL
BH CV LOWER VASCULAR LEFT PROXIMAL FEMORAL COMPRESS: NORMAL
BH CV LOWER VASCULAR LEFT SAPHENOFEMORAL JUNCTION AUGMENT: NORMAL
BH CV LOWER VASCULAR LEFT SAPHENOFEMORAL JUNCTION COMPETENT: NORMAL
BH CV LOWER VASCULAR LEFT SAPHENOFEMORAL JUNCTION COMPRESS: NORMAL
BH CV LOWER VASCULAR LEFT SAPHENOFEMORAL JUNCTION PHASIC: NORMAL
BH CV LOWER VASCULAR LEFT SAPHENOFEMORAL JUNCTION SPONT: NORMAL
BH CV LOWER VASCULAR RIGHT COMMON FEMORAL AUGMENT: NORMAL
BH CV LOWER VASCULAR RIGHT COMMON FEMORAL COMPETENT: NORMAL
BH CV LOWER VASCULAR RIGHT COMMON FEMORAL COMPRESS: NORMAL
BH CV LOWER VASCULAR RIGHT COMMON FEMORAL PHASIC: NORMAL
BH CV LOWER VASCULAR RIGHT COMMON FEMORAL SPONT: NORMAL

## 2020-05-08 PROCEDURE — 93971 EXTREMITY STUDY: CPT

## 2020-05-08 NOTE — ED PROVIDER NOTES
"Subjective   29-year-old 3-week postpartum female presents with complaint of left foot \"tingling and falling asleep all day\" she denies hormone replacement therapy or recent travel smoking chest pain dyspnea.  Denies posterior knee pain nor calf pain upon dorsiflexion of the toes.  Denies any history of coagulopathies DVT or PE.  Reports that she had a healthy pregnancy without complication.  Denies any complications with delivery.  Reports she is currently breast-feeding.    1. Location: Anterior lower leg  2. Quality: tingling  3. Severity: moderate  4. Worsening factors: denies  5. Alleviating factors: none  6. Onset: 2 days  7. Radiation: none  8. Frequency: intermittent  9. Co-morbidities: History reviewed. No pertinent past medical history.  10. Source: patient            Review of Systems   Musculoskeletal: Positive for myalgias.   Skin: Negative for color change, pallor, rash and wound.   Neurological: Negative for weakness and numbness.   Hematological: Does not bruise/bleed easily.   All other systems reviewed and are negative.      History reviewed. No pertinent past medical history.    No Known Allergies    History reviewed. No pertinent surgical history.    History reviewed. No pertinent family history.    Social History     Socioeconomic History   • Marital status:      Spouse name: Not on file   • Number of children: Not on file   • Years of education: Not on file   • Highest education level: Not on file   Tobacco Use   • Smoking status: Never Smoker   • Smokeless tobacco: Never Used   Substance and Sexual Activity   • Alcohol use: Not Currently   • Drug use: Not Currently   • Sexual activity: Yes     Partners: Male     Birth control/protection: None           Objective   Physical Exam   Constitutional: She is oriented to person, place, and time. Vital signs are normal. She appears well-developed and well-nourished. She is active and cooperative.  Non-toxic appearance. No distress. " "  Cardiovascular: Normal rate, regular rhythm, S1 normal, S2 normal, normal heart sounds, intact distal pulses and normal pulses. Exam reveals no gallop and no friction rub.   No murmur heard.  Pulses:       Radial pulses are 2+ on the right side, and 2+ on the left side.        Dorsalis pedis pulses are 2+ on the right side, and 2+ on the left side.        Posterior tibial pulses are 2+ on the right side, and 2+ on the left side.   Pulmonary/Chest: Effort normal and breath sounds normal. No stridor. No respiratory distress. She exhibits no tenderness.   Musculoskeletal: Normal range of motion. She exhibits tenderness. She exhibits no edema or deformity.        Left lower leg: Normal.        Legs:  Nontender upon palpation.  Negative Homans sign.  Distal pulses strong and equal bilaterally 2+.  There is no overlying erythema noted.  No ecchymosis noted.   Neurological: She is alert and oriented to person, place, and time. No sensory deficit. She exhibits normal muscle tone.   Skin: Skin is warm and dry. Capillary refill takes less than 2 seconds. No rash noted. No erythema. No pallor.   Psychiatric: She has a normal mood and affect. Her behavior is normal. Judgment and thought content normal.   Nursing note and vitals reviewed.      Procedures           ED Course                                           MDM  Number of Diagnoses or Management Options  Pain in left lower leg:   Diagnosis management comments: Chart Review: 4/28/2020 patient called OB/GYN to inquire assistance with breast-feeding.  Comorbidity: History reviewed. No pertinent past medical history.  Imaging: Was interpreted by physician and reviewed by myself:    Patient undressed and placed in gown for exam.  Appropriate PPE worn during patient exam. 29-year-old 3-week postpartum female presents with complaint of left foot \"tingling and falling asleep all day\" she denies hormone replacement therapy or recent travel smoking chest pain dyspnea.  Denies " posterior knee pain nor calf pain upon dorsiflexion of the toes.  Denies any history of coagulopathies DVT or PE.  Reports that she had a healthy pregnancy without complication.  Denies any complications with delivery.  Reports she is currently breast-feeding.  Explained to patient that vascular technician is not here at this time.  She is given an outpatient order for a Doppler of the left lower extremity.  She is given the phone number to call in the morning to schedule that appointment.    Disposition/Treatment: Discussed results with patient, verbalized understanding.  Discussed reasons to return to the ER, patient verbalized understanding.  Agreeable with plan of care.  Patient was stable upon discharge.            Patient Progress  Patient progress: stable      Final diagnoses:   Pain in left lower leg            Ramona Chen NP  05/08/20 0133

## 2023-02-01 ENCOUNTER — NURSE TRIAGE (OUTPATIENT)
Dept: CALL CENTER | Facility: HOSPITAL | Age: 33
End: 2023-02-01
Payer: COMMERCIAL

## 2023-02-01 NOTE — TELEPHONE ENCOUNTER
"    Reason for Disposition  • Wound care after sutures (or staples) removed, questions about    Additional Information  • Negative: [1] Major abdominal surgical wound AND [2] visible internal organs  • Negative: Sounds like a life-threatening emergency to the triager  • Negative: Surgical incision symptoms and questions  • Negative: New cut and caller wonders if it needs stitches  • Negative: Skin glue (Dermabond) questions  • Negative: Wound looks infected  • Negative: [1] Bleeding from wound AND [2] won't stop after 10 minutes of direct pressure (using correct technique)  • Negative: [1] Wound gaping open after sutures (or staples) AND [2] < 48 hours since wound re-opened  • Negative: Patient sounds very sick or weak to the triager  • Negative: [1] Wound gaping open after sutures (or staples) AND [2] > 48 hours since wound re-opened AND [3] length of opening > 1/2 inch (12 mm)  • Negative: [1] Face wound gaping open after sutures (or staples) AND [2] > 48 hours since wound re-opened AND [3] length of opening > 1/4 inch (6 mm)  • Negative: Suture or staple removal is overdue  • Negative: [1] Suture (or staple) came out early AND [2] > 48 hours since sutures placed AND [3] caller wants wound checked  • Negative: [1] Numbness extends beyond the wound edges AND [2] lasts > 8 hours  • Negative: Suture (or staple) came out early  • Negative: Care of sutured (or stapled) wound,  questions about  • Negative: Suture (or staple) removal date, questions about    Answer Assessment - Initial Assessment Questions  1. LOCATION: \"Where are the sutures (or staples) located?\"       lip  2. NUMBER: \"How many sutures (or staples) are there?\"       unknown  3. DATE IN: \"When were the sutures (or staples) put in?\"        Friday 01/27/2023  4. DATE OUT: \"When did your doctor tell you the sutures (or staples) needed to come out?\"     No date for sure said few days  5. OTHER SYMPTOMS: \"Do you have any other symptoms?\" (e.g., wound pain, " "discharge, fever?)      no  6. PREGNANCY: \"Is there any chance you are pregnant?\" \"When was your last menstrual period?\"      no    Protocols used: SUTURE OR STAPLE QUESTIONS-ADULT-      "

## 2023-03-01 ENCOUNTER — HOSPITAL ENCOUNTER (EMERGENCY)
Facility: HOSPITAL | Age: 33
Discharge: HOME | End: 2023-03-01
Attending: EMERGENCY MEDICINE | Admitting: EMERGENCY MEDICINE
Payer: COMMERCIAL

## 2023-03-01 VITALS
OXYGEN SATURATION: 97 % | BODY MASS INDEX: 24.99 KG/M2 | DIASTOLIC BLOOD PRESSURE: 77 MMHG | HEART RATE: 86 BPM | RESPIRATION RATE: 16 BRPM | TEMPERATURE: 98.1 F | WEIGHT: 150 LBS | SYSTOLIC BLOOD PRESSURE: 108 MMHG | HEIGHT: 65 IN

## 2023-03-01 DIAGNOSIS — R21 RASH: Primary | ICD-10-CM

## 2023-03-01 LAB
ALBUMIN SERPL-MCNC: 4.9 G/DL (ref 3.5–5.2)
ALBUMIN/GLOB SERPL: 2 G/DL
ALP SERPL-CCNC: 54 U/L (ref 39–117)
ALT SERPL W P-5'-P-CCNC: 14 U/L (ref 1–33)
ANION GAP SERPL CALCULATED.3IONS-SCNC: 7 MMOL/L (ref 5–15)
AST SERPL-CCNC: 15 U/L (ref 1–32)
B-HCG UR QL: NEGATIVE
BASOPHILS # BLD AUTO: 0.01 10*3/MM3 (ref 0–0.2)
BASOPHILS NFR BLD AUTO: 0.1 % (ref 0–1.5)
BILIRUB SERPL-MCNC: 0.4 MG/DL (ref 0–1.2)
BILIRUB UR QL STRIP: NEGATIVE
BUN SERPL-MCNC: 12 MG/DL (ref 6–20)
BUN/CREAT SERPL: 21.1 (ref 7–25)
CALCIUM SPEC-SCNC: 9.5 MG/DL (ref 8.6–10.5)
CHLORIDE SERPL-SCNC: 103 MMOL/L (ref 98–107)
CLARITY UR: CLEAR
CO2 SERPL-SCNC: 28 MMOL/L (ref 22–29)
COLOR UR: YELLOW
CREAT SERPL-MCNC: 0.57 MG/DL (ref 0.57–1)
DEPRECATED RDW RBC AUTO: 39.9 FL (ref 37–54)
EGFRCR SERPLBLD CKD-EPI 2021: 125.6 ML/MIN/1.73
EOSINOPHIL # BLD AUTO: 0.04 10*3/MM3 (ref 0–0.4)
EOSINOPHIL NFR BLD AUTO: 0.6 % (ref 0.3–6.2)
ERYTHROCYTE [DISTWIDTH] IN BLOOD BY AUTOMATED COUNT: 11.6 % (ref 12.3–15.4)
GLOBULIN UR ELPH-MCNC: 2.5 GM/DL
GLUCOSE SERPL-MCNC: 101 MG/DL (ref 65–99)
GLUCOSE UR STRIP-MCNC: NEGATIVE MG/DL
HCT VFR BLD AUTO: 39.2 % (ref 34–46.6)
HGB BLD-MCNC: 13.2 G/DL (ref 12–15.9)
HGB UR QL STRIP.AUTO: ABNORMAL
IMM GRANULOCYTES # BLD AUTO: 0.02 10*3/MM3 (ref 0–0.05)
IMM GRANULOCYTES NFR BLD AUTO: 0.3 % (ref 0–0.5)
KETONES UR QL STRIP: NEGATIVE
LEUKOCYTE ESTERASE UR QL STRIP.AUTO: ABNORMAL
LYMPHOCYTES # BLD AUTO: 1.98 10*3/MM3 (ref 0.7–3.1)
LYMPHOCYTES NFR BLD AUTO: 27.9 % (ref 19.6–45.3)
MCH RBC QN AUTO: 31.4 PG (ref 26.6–33)
MCHC RBC AUTO-ENTMCNC: 33.7 G/DL (ref 31.5–35.7)
MCV RBC AUTO: 93.1 FL (ref 79–97)
MONOCYTES # BLD AUTO: 0.6 10*3/MM3 (ref 0.1–0.9)
MONOCYTES NFR BLD AUTO: 8.5 % (ref 5–12)
NEUTROPHILS NFR BLD AUTO: 4.45 10*3/MM3 (ref 1.7–7)
NEUTROPHILS NFR BLD AUTO: 62.6 % (ref 42.7–76)
NITRITE UR QL STRIP: NEGATIVE
PH UR STRIP.AUTO: 5.5 [PH] (ref 5–8)
PLATELET # BLD AUTO: 222 10*3/MM3 (ref 140–450)
PMV BLD AUTO: 11.1 FL (ref 6–12)
POTASSIUM SERPL-SCNC: 3.4 MMOL/L (ref 3.5–5.2)
PROT SERPL-MCNC: 7.4 G/DL (ref 6–8.5)
PROT UR QL STRIP: ABNORMAL
RBC # BLD AUTO: 4.21 10*6/MM3 (ref 3.77–5.28)
SODIUM SERPL-SCNC: 138 MMOL/L (ref 136–145)
SP GR UR STRIP: >=1.03 (ref 1–1.03)
UROBILINOGEN UR QL STRIP: ABNORMAL
WBC NRBC COR # BLD: 7.1 10*3/MM3 (ref 3.4–10.8)

## 2023-03-01 PROCEDURE — 81025 URINE PREGNANCY TEST: CPT | Performed by: EMERGENCY MEDICINE

## 2023-03-01 PROCEDURE — 85025 COMPLETE CBC W/AUTO DIFF WBC: CPT | Performed by: EMERGENCY MEDICINE

## 2023-03-01 PROCEDURE — 99283 EMERGENCY DEPT VISIT LOW MDM: CPT | Performed by: EMERGENCY MEDICINE

## 2023-03-01 PROCEDURE — 63710000001 PREDNISONE PER 1 MG: Performed by: EMERGENCY MEDICINE

## 2023-03-01 PROCEDURE — 99283 EMERGENCY DEPT VISIT LOW MDM: CPT

## 2023-03-01 PROCEDURE — 80053 COMPREHEN METABOLIC PANEL: CPT | Performed by: EMERGENCY MEDICINE

## 2023-03-01 PROCEDURE — 81003 URINALYSIS AUTO W/O SCOPE: CPT | Performed by: EMERGENCY MEDICINE

## 2023-03-01 RX ORDER — PREDNISONE 20 MG/1
40 TABLET ORAL DAILY
Qty: 10 TABLET | Refills: 0 | Status: SHIPPED | OUTPATIENT
Start: 2023-03-01 | End: 2023-03-07

## 2023-03-01 RX ORDER — ONDANSETRON 4 MG/1
4 TABLET, ORALLY DISINTEGRATING ORAL ONCE
Status: DISCONTINUED | OUTPATIENT
Start: 2023-03-02 | End: 2023-03-02 | Stop reason: HOSPADM

## 2023-03-01 RX ORDER — DIPHENHYDRAMINE HCL 25 MG
25 CAPSULE ORAL EVERY 6 HOURS PRN
Qty: 15 CAPSULE | Refills: 0 | Status: SHIPPED | OUTPATIENT
Start: 2023-03-01

## 2023-03-01 RX ORDER — PREDNISONE 20 MG/1
60 TABLET ORAL ONCE
Status: COMPLETED | OUTPATIENT
Start: 2023-03-02 | End: 2023-03-01

## 2023-03-01 RX ADMIN — PREDNISONE 60 MG: 20 TABLET ORAL at 23:48

## 2023-03-02 PROCEDURE — 63710000001 PREDNISONE PER 1 MG: Performed by: EMERGENCY MEDICINE

## 2023-03-02 NOTE — FSED PROVIDER NOTE
Subjective   History of Present Illness  32 yof complains of an itchy rash that started yesterday. Pt notes generalized hives. Pt denies change in soaps, lotions or detergents. Pt denies recent travel, change in medication or new foot exposure. Pt request blood work. She reports the last time she had a rash like this she was pregnant and had an issue to her liver.         Review of Systems   Constitutional: Positive for appetite change. Negative for fever.   Genitourinary: Negative for dysuria, frequency and urgency.   Skin: Positive for rash.   All other systems reviewed and are negative.      History reviewed. No pertinent past medical history.    No Known Allergies    History reviewed. No pertinent surgical history.    History reviewed. No pertinent family history.    Social History     Socioeconomic History   • Marital status:    Tobacco Use   • Smoking status: Never   • Smokeless tobacco: Never   Substance and Sexual Activity   • Alcohol use: Not Currently   • Drug use: Not Currently   • Sexual activity: Yes     Partners: Male     Birth control/protection: None           Objective   Physical Exam  Vitals reviewed.   Constitutional:       Appearance: Normal appearance.   HENT:      Head: Normocephalic and atraumatic.      Nose: Nose normal.      Mouth/Throat:      Mouth: Mucous membranes are moist.      Pharynx: Oropharynx is clear.   Eyes:      Extraocular Movements: Extraocular movements intact.      Pupils: Pupils are equal, round, and reactive to light.   Cardiovascular:      Rate and Rhythm: Normal rate and regular rhythm.      Pulses: Normal pulses.      Heart sounds: Normal heart sounds.   Pulmonary:      Effort: Pulmonary effort is normal.      Breath sounds: Normal breath sounds.   Abdominal:      General: Abdomen is flat.      Palpations: Abdomen is soft.      Tenderness: There is no abdominal tenderness.   Musculoskeletal:      Cervical back: Normal range of motion.   Skin:     General: Skin is  warm and dry.      Capillary Refill: Capillary refill takes less than 2 seconds.      Findings: Rash present.   Neurological:      General: No focal deficit present.      Mental Status: She is alert.         Procedures           ED Course                                           Medical Decision Making  This patient who presents with rash for 2 daysconsistent with urticaria. History and exam findings not consistent with dangerous etiologies of rash such as SJS/TEN, or secondary dangerous causes such as petechial rashes from thrombocytopenia or rickettsial infection. Plan at this time is to treat symptomatically, instruct to follow up with PCP or derm PRN.    Rash: acute illness or injury  Amount and/or Complexity of Data Reviewed  Labs: ordered.      Risk  Prescription drug management.          Final diagnoses:   Rash       ED Disposition  ED Disposition     ED Disposition   Discharge    Condition   Stable    Comment   --             PATIENT CONNECTION - [facility] [address]  [phone]             Medication List      New Prescriptions    diphenhydrAMINE 25 mg capsule  Commonly known as: BENADRYL  Take 1 capsule by mouth Every 6 (Six) Hours As Needed for Itching.     predniSONE 20 MG tablet  Commonly known as: DELTASONE  Take 2 tablets by mouth Daily for 5 days.           Where to Get Your Medications      These medications were sent to [facility]/pharmacy #[id number] - [place], [place] - [address] [place] [address] - [phone]  - [phone] [address] [place] [address]    Hours: 24-hours Phone: [phone]   · diphenhydrAMINE 25 mg capsule  · predniSONE 20 MG tablet

## 2023-03-02 NOTE — ED NOTES
Pt called back 03/02/23 stating d/c 3/1/23 and was told she can get an antibiotic for her uti if she felt like she needed it. Spoke to dr murcia today and he was willing to prescribe her Macrobid for her uti.

## 2023-03-06 NOTE — PROGRESS NOTES
Chief Complaint  Chief Complaint   Patient presents with   • Establish Care   • Fatigue     Loss of appetite        Subjective          Victoriano Garcia is here today to establish care. The following problems were discussed:     Rash/abdominal pain- 2 weeks ago she started to have abdomen pain and could  Not have a bowel movement. She felt cramps and has accompanied fatigue. She had decreased appetite. Last Tuesday her feet and abdomen were itchy. She had hives all over her. [place] [facility] [place] Has happened before when she was pregnant and was told it was something to due with her liver and BP. Abdominal pain currently in lower abdomen. Describes the pain as achy. Denies urinary issues, fever, chills, or diarrhea. Abdominal pain currently 3/10. Denies aggravating or alleviating factors. Reports she was on predisone and benadryl for rash, but quit taking predisone because she did not like the way it made her feel. Her sons both had strep last week, but denies that she ever had a sore throat.       She is from this area   Previous PCP : Did not have one   Marital status-   Children- Yes  Works as [employer]  Exercise- regularly 4-5 times weekly  Diet- Eating well-balanced meals        Review of Systems   Constitutional: Negative for chills and fever.   HENT: Positive for congestion.    Respiratory: Negative for shortness of breath.    Cardiovascular: Negative for chest pain.   Gastrointestinal: Positive for abdominal pain, constipation, nausea and vomiting.   Genitourinary: Negative for difficulty urinating, menstrual problem and pelvic pain.   Musculoskeletal: Negative for arthralgias.   Skin: Positive for rash.   Neurological: Negative for dizziness, light-headedness and headache.        Objective   Vital Signs:   Vitals:    03/07/23 1030   BP: 105/70   Pulse: 80   Resp: 16   Temp: 98.2 °F (36.8 °C)   SpO2: 97%      Estimated body mass index is 27.29 kg/m² as calculated from the following:    Height as of this  "encounter: 165.1 cm (65\").    Weight as of this encounter: 74.4 kg (164 lb).            Physical Exam  Vitals reviewed.   Constitutional:       Appearance: Normal appearance. She is normal weight.   HENT:      Head: Normocephalic and atraumatic.   Eyes:      Extraocular Movements: Extraocular movements intact.      Conjunctiva/sclera: Conjunctivae normal.   Cardiovascular:      Rate and Rhythm: Normal rate and regular rhythm.      Pulses: Normal pulses.      Heart sounds: Normal heart sounds.      Comments: S1, S2 audible  Pulmonary:      Effort: Pulmonary effort is normal.      Breath sounds: Normal breath sounds.      Comments: On room air   Abdominal:      General: Abdomen is flat.      Palpations: Abdomen is soft.      Tenderness: There is abdominal tenderness.      Comments: Hyperactive , tenderness in lower abdomen    Musculoskeletal:         General: Normal range of motion.      Cervical back: Normal range of motion.   Skin:     General: Skin is warm and dry.   Neurological:      General: No focal deficit present.      Mental Status: She is alert and oriented to person, place, and time. Mental status is at baseline.   Psychiatric:         Mood and Affect: Mood normal.         Behavior: Behavior normal.         Thought Content: Thought content normal.         Judgment: Judgment normal.                Physical Exam   Result Review :             Procedures       Assessment and Plan     Diagnoses and all orders for this visit:    1. Lower abdominal pain (Primary)  Assessment & Plan:  2 weeks ago she started to have abdomen pain and could  Not have a bowel movement. She felt cramps and has accompanied fatigue. She had decreased appetite. Last Tuesday her feet and abdomen were itchy. She had hives all over her. [place] [facility] [place] Has happened before when she was pregnant and was told it was something to due with her liver and BP. Abdominal pain currently in lower abdomen. Describes the pain as achy. Denies urinary " issues, fever, chills, or diarrhea. Abdominal pain currently 3/10. Denies aggravating or alleviating factors. Reports she was on predisone and benadryl for rash, but quit taking predisone because she did not like the way it made her feel. Her sons both had strep last week, but denies that she ever had a sore throat.     Could be allergic reaction- reports she has not changed any detergents etc, cannot rule out other etiologies     Encourage bland diet until abdomen pain resolves    Check CBC, ESR, celiac,CRP and CMP     Encourage to take miralax and ducolax/biscodyl     KUB ordered    Orders:  -     CBC & Differential  -     Comprehensive metabolic panel; Future  -     CBC & Differential  -     Comprehensive metabolic panel  -     XR Abdomen KUB (In Office)  -     C-reactive Protein  -     Sedimentation rate, automated  -     Celiac Disease Antibody Screen    2. Encounter to establish care    Other orders  -     ondansetron (Zofran) 4 MG tablet; Take 1 tablet by mouth Every 8 (Eight) Hours As Needed for Nausea or Vomiting.  Dispense: 30 tablet; Refill: 0            Follow Up   Return in about 1 month (around 4/7/2023) for Annual physical.   Patient was given instructions and counseling regarding her condition or for health maintenance advice. Please see specific information pulled into the AVS if appropriate.

## 2023-03-07 ENCOUNTER — OFFICE VISIT (OUTPATIENT)
Dept: FAMILY MEDICINE CLINIC | Facility: CLINIC | Age: 33
End: 2023-03-07
Payer: COMMERCIAL

## 2023-03-07 ENCOUNTER — TELEPHONE (OUTPATIENT)
Dept: FAMILY MEDICINE CLINIC | Facility: CLINIC | Age: 33
End: 2023-03-07

## 2023-03-07 VITALS
RESPIRATION RATE: 16 BRPM | WEIGHT: 164 LBS | HEIGHT: 65 IN | BODY MASS INDEX: 27.32 KG/M2 | SYSTOLIC BLOOD PRESSURE: 105 MMHG | DIASTOLIC BLOOD PRESSURE: 70 MMHG | OXYGEN SATURATION: 97 % | TEMPERATURE: 98.2 F | HEART RATE: 80 BPM

## 2023-03-07 DIAGNOSIS — R10.30 LOWER ABDOMINAL PAIN: Primary | ICD-10-CM

## 2023-03-07 DIAGNOSIS — Z76.89 ENCOUNTER TO ESTABLISH CARE: ICD-10-CM

## 2023-03-07 PROBLEM — R10.9 ABDOMINAL PAIN: Status: ACTIVE | Noted: 2023-03-07

## 2023-03-07 PROCEDURE — 99204 OFFICE O/P NEW MOD 45 MIN: CPT | Performed by: NURSE PRACTITIONER

## 2023-03-07 PROCEDURE — 86258 DGP ANTIBODY EACH IG CLASS: CPT | Performed by: NURSE PRACTITIONER

## 2023-03-07 PROCEDURE — 82784 ASSAY IGA/IGD/IGG/IGM EACH: CPT | Performed by: NURSE PRACTITIONER

## 2023-03-07 PROCEDURE — 85652 RBC SED RATE AUTOMATED: CPT | Performed by: NURSE PRACTITIONER

## 2023-03-07 PROCEDURE — 80053 COMPREHEN METABOLIC PANEL: CPT | Performed by: NURSE PRACTITIONER

## 2023-03-07 PROCEDURE — 85025 COMPLETE CBC W/AUTO DIFF WBC: CPT | Performed by: NURSE PRACTITIONER

## 2023-03-07 PROCEDURE — 86364 TISS TRNSGLTMNASE EA IG CLAS: CPT | Performed by: NURSE PRACTITIONER

## 2023-03-07 PROCEDURE — 86140 C-REACTIVE PROTEIN: CPT | Performed by: NURSE PRACTITIONER

## 2023-03-07 RX ORDER — ONDANSETRON 4 MG/1
4 TABLET, FILM COATED ORAL EVERY 8 HOURS PRN
Qty: 30 TABLET | Refills: 0 | Status: SHIPPED | OUTPATIENT
Start: 2023-03-07

## 2023-03-07 NOTE — ASSESSMENT & PLAN NOTE
2 weeks ago she started to have abdomen pain and could  Not have a bowel movement. She felt cramps and has accompanied fatigue. She had decreased appetite. Last Tuesday her feet and abdomen were itchy. She had hives all over her. She went to [facility] [place]. Has happened before when she was pregnant and was told it was something to due with her liver and BP. Abdominal pain currently in lower abdomen. Describes the pain as achy. Denies urinary issues, fever, chills, or diarrhea. Abdominal pain currently 3/10. Denies aggravating or alleviating factors. Reports she was on predisone and benadryl for rash, but quit taking predisone because she did not like the way it made her feel. Her sons both had strep last week, but denies that she ever had a sore throat.     Could be allergic reaction- reports she has not changed any detergents etc, cannot rule out other etiologies     Encourage bland diet until abdomen pain resolves    Check CBC, ESR, celiac,CRP and CMP     Encourage to take miralax and ducolax/biscodyl     KUB ordered

## 2023-03-07 NOTE — PATIENT INSTRUCTIONS
Check labs     Prescribed zofran for nausea     Encourage to take miralax and ducolax/biscodyl     KUB ordered

## 2023-03-07 NOTE — TELEPHONE ENCOUNTER
Called and spoke with Victoriano about KUB results.  Patient noted to have constipation to which we thought was the case.  Patient educated to continue treatment regimen discussed during office visit.

## 2023-03-08 LAB
ALBUMIN SERPL-MCNC: 4.9 G/DL (ref 3.5–5.2)
ALBUMIN/GLOB SERPL: 1.8 G/DL
ALP SERPL-CCNC: 60 U/L (ref 39–117)
ALT SERPL W P-5'-P-CCNC: 21 U/L (ref 1–33)
ANION GAP SERPL CALCULATED.3IONS-SCNC: 12 MMOL/L (ref 5–15)
AST SERPL-CCNC: 16 U/L (ref 1–32)
BASOPHILS # BLD AUTO: 0.03 10*3/MM3 (ref 0–0.2)
BASOPHILS NFR BLD AUTO: 0.3 % (ref 0–1.5)
BILIRUB SERPL-MCNC: 0.6 MG/DL (ref 0–1.2)
BUN SERPL-MCNC: 12 MG/DL (ref 6–20)
BUN/CREAT SERPL: 18.2 (ref 7–25)
CALCIUM SPEC-SCNC: 10 MG/DL (ref 8.6–10.5)
CHLORIDE SERPL-SCNC: 101 MMOL/L (ref 98–107)
CO2 SERPL-SCNC: 28 MMOL/L (ref 22–29)
CREAT SERPL-MCNC: 0.66 MG/DL (ref 0.57–1)
CRP SERPL-MCNC: <0.3 MG/DL (ref 0–0.5)
DEPRECATED RDW RBC AUTO: 40.3 FL (ref 37–54)
EGFRCR SERPLBLD CKD-EPI 2021: 121.2 ML/MIN/1.73
EOSINOPHIL # BLD AUTO: 0.06 10*3/MM3 (ref 0–0.4)
EOSINOPHIL NFR BLD AUTO: 0.6 % (ref 0.3–6.2)
ERYTHROCYTE [DISTWIDTH] IN BLOOD BY AUTOMATED COUNT: 11.8 % (ref 12.3–15.4)
ERYTHROCYTE [SEDIMENTATION RATE] IN BLOOD: 9 MM/HR (ref 0–20)
GLIADIN PEPTIDE IGA SER-ACNC: 6 UNITS (ref 0–19)
GLOBULIN UR ELPH-MCNC: 2.8 GM/DL
GLUCOSE SERPL-MCNC: 97 MG/DL (ref 65–99)
HCT VFR BLD AUTO: 43.1 % (ref 34–46.6)
HGB BLD-MCNC: 14.5 G/DL (ref 12–15.9)
IGA SERPL-MCNC: 320 MG/DL (ref 87–352)
IMM GRANULOCYTES # BLD AUTO: 0.03 10*3/MM3 (ref 0–0.05)
IMM GRANULOCYTES NFR BLD AUTO: 0.3 % (ref 0–0.5)
LYMPHOCYTES # BLD AUTO: 1.43 10*3/MM3 (ref 0.7–3.1)
LYMPHOCYTES NFR BLD AUTO: 14.1 % (ref 19.6–45.3)
MCH RBC QN AUTO: 31.6 PG (ref 26.6–33)
MCHC RBC AUTO-ENTMCNC: 33.6 G/DL (ref 31.5–35.7)
MCV RBC AUTO: 93.9 FL (ref 79–97)
MONOCYTES # BLD AUTO: 0.62 10*3/MM3 (ref 0.1–0.9)
MONOCYTES NFR BLD AUTO: 6.1 % (ref 5–12)
NEUTROPHILS NFR BLD AUTO: 7.94 10*3/MM3 (ref 1.7–7)
NEUTROPHILS NFR BLD AUTO: 78.6 % (ref 42.7–76)
NRBC BLD AUTO-RTO: 0 /100 WBC (ref 0–0.2)
PLATELET # BLD AUTO: 245 10*3/MM3 (ref 140–450)
PMV BLD AUTO: 12.3 FL (ref 6–12)
POTASSIUM SERPL-SCNC: 4.7 MMOL/L (ref 3.5–5.2)
PROT SERPL-MCNC: 7.7 G/DL (ref 6–8.5)
RBC # BLD AUTO: 4.59 10*6/MM3 (ref 3.77–5.28)
SODIUM SERPL-SCNC: 141 MMOL/L (ref 136–145)
TTG IGA SER-ACNC: <2 U/ML (ref 0–3)
WBC NRBC COR # BLD: 10.11 10*3/MM3 (ref 3.4–10.8)

## 2023-03-10 ENCOUNTER — PATIENT ROUNDING (BHMG ONLY) (OUTPATIENT)
Dept: FAMILY MEDICINE CLINIC | Facility: CLINIC | Age: 33
End: 2023-03-10
Payer: COMMERCIAL

## 2023-03-24 ENCOUNTER — OFFICE VISIT (OUTPATIENT)
Dept: FAMILY MEDICINE CLINIC | Facility: CLINIC | Age: 33
End: 2023-03-24
Payer: COMMERCIAL

## 2023-03-24 VITALS
HEIGHT: 65 IN | RESPIRATION RATE: 16 BRPM | SYSTOLIC BLOOD PRESSURE: 102 MMHG | TEMPERATURE: 97.8 F | HEART RATE: 51 BPM | OXYGEN SATURATION: 97 % | DIASTOLIC BLOOD PRESSURE: 70 MMHG | WEIGHT: 169 LBS | BODY MASS INDEX: 28.16 KG/M2

## 2023-03-24 DIAGNOSIS — H65.03 NON-RECURRENT ACUTE SEROUS OTITIS MEDIA OF BOTH EARS: Primary | ICD-10-CM

## 2023-03-24 PROBLEM — H66.90 OTITIS MEDIA: Status: ACTIVE | Noted: 2023-03-24

## 2023-03-24 RX ORDER — CEFDINIR 300 MG/1
300 CAPSULE ORAL 2 TIMES DAILY
Qty: 14 CAPSULE | Refills: 0 | Status: SHIPPED | OUTPATIENT
Start: 2023-03-24

## 2023-03-24 NOTE — ASSESSMENT & PLAN NOTE
Wednesday her throat started to hurt. Yesterday both ears started to hurt. She has had some body aches. She has had ear pain again today. She does not feel like she has had strep throat. She not been around anyone sick. Denies fever or chills. She has tried hot tea at home and has helped some.     Wax noted in left ear, Drainage noted in right ear- yellow drainage     Prescribed augmentin

## 2023-03-24 NOTE — PROGRESS NOTES
"Chief Complaint  Chief Complaint   Patient presents with   • Sinusitis   • Earache     L > R        Subjective          Victoriano Garcia is a 32-year-old female who presents to the office today with complaints of sore throat and ear pain.    Sore throat/ear pain- Wednesday her throat started to hurt. Yesterday both ears started to hurt. She has had some body aches. She has had ear pain again today. She does not feel like she has had strep throat. She not been around anyone sick. Denies fever or chills. She has tried hot tea at home and has helped some.        Review of Systems   Constitutional: Negative for chills and fever.   HENT: Positive for congestion, ear pain and sore throat.    Respiratory: Negative for shortness of breath.    Cardiovascular: Negative for chest pain.   Gastrointestinal: Negative for abdominal pain, nausea and vomiting.   Genitourinary: Negative for difficulty urinating.   Musculoskeletal: Negative for arthralgias.   Skin: Negative.    Neurological: Negative for dizziness, light-headedness and headache.        Objective   Vital Signs:   Vitals:    03/24/23 1536   BP: 102/70   Pulse: 51   Resp: 16   Temp: 97.8 °F (36.6 °C)   SpO2: 97%      Estimated body mass index is 28.12 kg/m² as calculated from the following:    Height as of this encounter: 165.1 cm (65\").    Weight as of this encounter: 76.7 kg (169 lb).          Physical Exam  Vitals reviewed.   Constitutional:       Appearance: Normal appearance. She is normal weight.   HENT:      Head: Normocephalic and atraumatic.      Ears:      Comments: Wax noted in left ear, Drainage noted in right ear- yellow drainage      Nose: Nose normal.      Mouth/Throat:      Mouth: Mucous membranes are moist.      Pharynx: Oropharynx is clear. Posterior oropharyngeal erythema present.   Eyes:      Extraocular Movements: Extraocular movements intact.      Conjunctiva/sclera: Conjunctivae normal.   Cardiovascular:      Rate and Rhythm: Normal rate and regular " rhythm.      Pulses: Normal pulses.      Heart sounds: Normal heart sounds.      Comments: S1, S2 audible  Pulmonary:      Effort: Pulmonary effort is normal.      Breath sounds: Normal breath sounds.      Comments: On room air   Musculoskeletal:         General: Normal range of motion.      Cervical back: Normal range of motion.   Skin:     General: Skin is warm and dry.   Neurological:      General: No focal deficit present.      Mental Status: She is alert and oriented to person, place, and time. Mental status is at baseline.   Psychiatric:         Mood and Affect: Mood normal.         Behavior: Behavior normal.         Thought Content: Thought content normal.         Judgment: Judgment normal.                Physical Exam   Result Review :             Procedures       Assessment and Plan     Diagnoses and all orders for this visit:    1. Non-recurrent acute serous otitis media of both ears (Primary)  Assessment & Plan:  Wednesday her throat started to hurt. Yesterday both ears started to hurt. She has had some body aches. She has had ear pain again today. She does not feel like she has had strep throat. She not been around anyone sick. Denies fever or chills. She has tried hot tea at home and has helped some.     Wax noted in left ear, Drainage noted in right ear- yellow drainage     Prescribed augmentin       Other orders  -     cefdinir (OMNICEF) 300 MG capsule; Take 1 capsule by mouth 2 (Two) Times a Day.  Dispense: 14 capsule; Refill: 0            Follow Up   Return if symptoms worsen or fail to improve.   Patient was given instructions and counseling regarding her condition or for health maintenance advice. Please see specific information pulled into the AVS if appropriate.

## 2023-04-17 ENCOUNTER — OFFICE VISIT (OUTPATIENT)
Dept: FAMILY MEDICINE CLINIC | Facility: CLINIC | Age: 33
End: 2023-04-17
Payer: COMMERCIAL

## 2023-04-17 VITALS
BODY MASS INDEX: 26.99 KG/M2 | SYSTOLIC BLOOD PRESSURE: 107 MMHG | RESPIRATION RATE: 16 BRPM | HEIGHT: 65 IN | TEMPERATURE: 98.4 F | OXYGEN SATURATION: 99 % | HEART RATE: 61 BPM | DIASTOLIC BLOOD PRESSURE: 71 MMHG | WEIGHT: 162 LBS

## 2023-04-17 DIAGNOSIS — Z00.00 ENCOUNTER FOR ANNUAL PHYSICAL EXAM: Primary | ICD-10-CM

## 2023-04-17 PROCEDURE — 80061 LIPID PANEL: CPT | Performed by: NURSE PRACTITIONER

## 2023-04-17 PROCEDURE — 83036 HEMOGLOBIN GLYCOSYLATED A1C: CPT | Performed by: NURSE PRACTITIONER

## 2023-04-17 NOTE — ASSESSMENT & PLAN NOTE
Denies family history of major health conditions. Denies smoking, drinking, or illegal drug use.     PHQ-score: 0  Encourage exercise  Encourage monthly breast exams   Check labs  Papsmear- Encouraged to follow-up with GYN

## 2023-04-17 NOTE — PROGRESS NOTES
Venipuncture performed in L ARM by RT Temo  with good hemostasis. Patient tolerated well. 04/17/23 Linda Alexander, RT

## 2023-04-17 NOTE — PATIENT INSTRUCTIONS
Encourage exercise  Encourage monthly breast exams   Check labs  Papsmear- Encouraged to follow-up with GYN

## 2023-04-17 NOTE — PROGRESS NOTES
"Chief Complaint  Chief Complaint   Patient presents with   • Annual Exam        Subjective          Victoriano Garcia is a 32-year-old female who presents to the office today for annual physical.    Annual physical- Denies family history of major health conditions. Denies smoking, drinking, or illegal drug use.     Labs- Due   Papsmear- Due       Vaccines:  Tdap- Up to date   Covid-19-  Refused     Dental exam- Up to date   Eye exam- Up to date            Review of Systems   Constitutional: Negative for chills and fever.   HENT: Negative for congestion.    Eyes: Negative.    Respiratory: Negative for shortness of breath.    Cardiovascular: Negative for chest pain.   Gastrointestinal: Negative for abdominal pain, nausea and vomiting.   Endocrine: Negative for polydipsia and polyuria.   Genitourinary: Negative for difficulty urinating.   Musculoskeletal: Negative for arthralgias.   Skin:        Moles on abdomen    Neurological: Negative for dizziness, light-headedness and headache.   Psychiatric/Behavioral: Negative for depressed mood. The patient is not nervous/anxious.         Objective   Vital Signs:   Vitals:    04/17/23 1019   BP: 107/71   Pulse: 61   Resp: 16   Temp: 98.4 °F (36.9 °C)   SpO2: 99%      Estimated body mass index is 26.96 kg/m² as calculated from the following:    Height as of this encounter: 165.1 cm (65\").    Weight as of this encounter: 73.5 kg (162 lb).    BMI is >= 25 and <30. (Overweight) The following options were offered after discussion;: exercise counseling/recommendations            Patient screened positive for depression based on a PHQ-9 score of 0 on 3/7/2023. Follow-up recommendations include: PCP managing depression.        Physical Exam  Vitals reviewed.   Constitutional:       Appearance: Normal appearance. She is normal weight.   HENT:      Head: Normocephalic and atraumatic.      Right Ear: Tympanic membrane normal.      Left Ear: Tympanic membrane normal.      Nose: Nose normal. "      Mouth/Throat:      Mouth: Mucous membranes are moist.      Pharynx: Oropharynx is clear.   Eyes:      Extraocular Movements: Extraocular movements intact.      Conjunctiva/sclera: Conjunctivae normal.      Pupils: Pupils are equal, round, and reactive to light.   Cardiovascular:      Rate and Rhythm: Normal rate and regular rhythm.      Pulses: Normal pulses.      Heart sounds: Normal heart sounds.      Comments: S1, S2 audible  Pulmonary:      Effort: Pulmonary effort is normal.      Breath sounds: Normal breath sounds.      Comments: On room air   Abdominal:      General: Abdomen is flat. Bowel sounds are normal.      Palpations: Abdomen is soft.   Musculoskeletal:         General: Normal range of motion.      Cervical back: Normal range of motion.   Skin:     General: Skin is warm and dry.   Neurological:      General: No focal deficit present.      Mental Status: She is alert and oriented to person, place, and time. Mental status is at baseline.   Psychiatric:         Mood and Affect: Mood normal.         Behavior: Behavior normal.         Thought Content: Thought content normal.         Judgment: Judgment normal.                Physical Exam   Result Review :             Procedures       Assessment and Plan     Diagnoses and all orders for this visit:    1. Encounter for annual physical exam (Primary)  Assessment & Plan:  Denies family history of major health conditions. Denies smoking, drinking, or illegal drug use.     PHQ-score: 0  Encourage exercise  Encourage monthly breast exams   Check labs  Papsmear- Encouraged to follow-up with GYN     Orders:  -     Lipid panel  -     Hemoglobin A1c            Follow Up   Return in about 1 year (around 4/17/2024) for Annual physical.   Patient was given instructions and counseling regarding her condition or for health maintenance advice. Please see specific information pulled into the AVS if appropriate.

## 2023-04-18 LAB
CHOLEST SERPL-MCNC: 155 MG/DL (ref 0–200)
HBA1C MFR BLD: 4.8 % (ref 4.8–5.6)
HDLC SERPL-MCNC: 53 MG/DL (ref 40–60)
LDLC SERPL CALC-MCNC: 90 MG/DL (ref 0–100)
LDLC/HDLC SERPL: 1.71 {RATIO}
TRIGL SERPL-MCNC: 56 MG/DL (ref 0–150)
VLDLC SERPL-MCNC: 12 MG/DL (ref 5–40)

## 2023-12-01 ENCOUNTER — OFFICE VISIT (OUTPATIENT)
Dept: FAMILY MEDICINE CLINIC | Facility: CLINIC | Age: 33
End: 2023-12-01
Payer: COMMERCIAL

## 2023-12-01 VITALS
DIASTOLIC BLOOD PRESSURE: 72 MMHG | BODY MASS INDEX: 27.32 KG/M2 | TEMPERATURE: 98 F | HEART RATE: 67 BPM | HEIGHT: 65 IN | WEIGHT: 164 LBS | SYSTOLIC BLOOD PRESSURE: 108 MMHG | OXYGEN SATURATION: 100 %

## 2023-12-01 DIAGNOSIS — R42 VERTIGO: Primary | ICD-10-CM

## 2023-12-01 PROCEDURE — 99213 OFFICE O/P EST LOW 20 MIN: CPT | Performed by: NURSE PRACTITIONER

## 2023-12-01 RX ORDER — MECLIZINE HYDROCHLORIDE 25 MG/1
25 TABLET ORAL 3 TIMES DAILY PRN
Qty: 30 TABLET | Refills: 0 | Status: SHIPPED | OUTPATIENT
Start: 2023-12-01

## 2023-12-01 RX ORDER — ONDANSETRON 4 MG/1
4 TABLET, FILM COATED ORAL EVERY 8 HOURS PRN
Qty: 18 TABLET | Refills: 0 | Status: SHIPPED | OUTPATIENT
Start: 2023-12-01

## 2023-12-01 NOTE — PROGRESS NOTES
"Chief Complaint  Chief Complaint   Patient presents with    Dizziness     SOS - Wednesday night     Nausea     SOS last night         Subjective          Victoriano Garcia is a 33 year old female who presents to the office today due to dizziness.     Dizziness- Started over 24 hours ago. She reports this has happened before. She reports she has some nausea associated with this. She denies allergies or recent ear pain.          Review of Systems   Constitutional:  Negative for chills and fever.   HENT:  Negative for congestion.    Cardiovascular:  Negative for chest pain.   Gastrointestinal:  Positive for nausea. Negative for abdominal pain and vomiting.   Genitourinary:  Negative for difficulty urinating.   Musculoskeletal:  Negative for myalgias.   Skin: Negative.    Neurological:  Positive for dizziness. Negative for light-headedness and headache.        Objective   Vital Signs:   Vitals:    12/01/23 1408   BP: 108/72   Pulse: 67   Temp: 98 °F (36.7 °C)   SpO2: 100%      Estimated body mass index is 27.29 kg/m² as calculated from the following:    Height as of this encounter: 165.1 cm (65\").    Weight as of this encounter: 74.4 kg (164 lb).                          Physical Exam  Vitals reviewed.   Constitutional:       Appearance: Normal appearance. She is normal weight.   HENT:      Head: Normocephalic and atraumatic.      Nose: Nose normal.      Mouth/Throat:      Mouth: Mucous membranes are moist.      Pharynx: Oropharynx is clear.   Eyes:      Extraocular Movements: Extraocular movements intact.      Conjunctiva/sclera: Conjunctivae normal.      Comments: Wears glasses    Cardiovascular:      Rate and Rhythm: Normal rate and regular rhythm.      Pulses: Normal pulses.      Heart sounds: Normal heart sounds.      Comments: S1, S2 audible  Pulmonary:      Effort: Pulmonary effort is normal.      Breath sounds: Normal breath sounds.      Comments: On room air   Abdominal:      General: Abdomen is flat. Bowel sounds " are normal.      Palpations: Abdomen is soft.   Musculoskeletal:         General: Normal range of motion.      Cervical back: Normal range of motion.   Skin:     General: Skin is warm and dry.   Neurological:      General: No focal deficit present.      Mental Status: She is alert and oriented to person, place, and time. Mental status is at baseline.   Psychiatric:         Mood and Affect: Mood normal.         Behavior: Behavior normal.         Thought Content: Thought content normal.         Judgment: Judgment normal.                Physical Exam   Result Review :             Procedures       Assessment and Plan     Diagnoses and all orders for this visit:    1. Vertigo (Primary)  Assessment & Plan:  Dizziness- Started over 24 hours ago. She reports this has happened before. She reports she has some nausea associated with this. She denies allergies or recent ear pain.     Likely vertigo    Prescribed meclizine and zofran    Patient education given     Consider PT if needed       Other orders  -     meclizine (ANTIVERT) 25 MG tablet; Take 1 tablet by mouth 3 (Three) Times a Day As Needed for Dizziness.  Dispense: 30 tablet; Refill: 0  -     ondansetron (Zofran) 4 MG tablet; Take 1 tablet by mouth Every 8 (Eight) Hours As Needed for Nausea or Vomiting.  Dispense: 18 tablet; Refill: 0              Follow Up   Return if symptoms worsen or fail to improve.   Patient was given instructions and counseling regarding her condition or for health maintenance advice. Please see specific information pulled into the AVS if appropriate.

## 2023-12-01 NOTE — ASSESSMENT & PLAN NOTE
Dizziness- Started over 24 hours ago. She reports this has happened before. She reports she has some nausea associated with this. She denies allergies or recent ear pain.     Likely vertigo    Prescribed meclizine and zofran    Patient education given     Consider PT if needed